# Patient Record
Sex: FEMALE | Race: BLACK OR AFRICAN AMERICAN | Employment: FULL TIME | URBAN - METROPOLITAN AREA
[De-identification: names, ages, dates, MRNs, and addresses within clinical notes are randomized per-mention and may not be internally consistent; named-entity substitution may affect disease eponyms.]

---

## 2024-07-07 ENCOUNTER — HOSPITAL ENCOUNTER (EMERGENCY)
Facility: HOSPITAL | Age: 34
Discharge: HOME | End: 2024-07-07
Attending: EMERGENCY MEDICINE
Payer: COMMERCIAL

## 2024-07-07 VITALS
BODY MASS INDEX: 23.98 KG/M2 | HEIGHT: 64 IN | SYSTOLIC BLOOD PRESSURE: 117 MMHG | DIASTOLIC BLOOD PRESSURE: 73 MMHG | WEIGHT: 140.43 LBS | HEART RATE: 77 BPM | OXYGEN SATURATION: 100 % | RESPIRATION RATE: 12 BRPM | TEMPERATURE: 98.2 F
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
## 2024-07-07 PROCEDURE — 99282 EMERGENCY DEPT VISIT SF MDM: CPT

## 2024-07-07 ASSESSMENT — PAIN - FUNCTIONAL ASSESSMENT: PAIN_FUNCTIONAL_ASSESSMENT: NONE - DENIES PAIN

## 2024-07-08 NOTE — ED TRIAGE NOTES
Pt reports to Ed with friend. Pt reports that she was bit by what she believes to be a black rat snake at around 2145. Pt's  cleaned wound with alcohol swab. Pt was bit on the lateral right ankle; pt denies pain but reports \"itchiness\" at site. Pt denies any swelling; bite is not actively bleeding. Pt denies any other symptoms.

## 2024-07-08 NOTE — ED NOTES
Pt given discharge instructions, pt education, 0 prescriptions and follow up information. Pt verbalizes understanding. All questions answered. Pt discharged to home in private vehicle with family, ambulatory. Pt A&O x4, RA, pain controlled.

## 2024-07-08 NOTE — ED PROVIDER NOTES
[facility] [place] EMERGENCY DEPT  EMERGENCY DEPARTMENT ENCOUNTER      Pt Name: Donna Tobias  MRN: [id number]  Birthdate 1990  Date of evaluation: 7/7/2024  Provider: Derek Colby MD    CHIEF COMPLAINT       Chief Complaint   Patient presents with    Snake Bite     Lateral right ankle         HISTORY OF PRESENT ILLNESS   (Location/Symptom, Timing/Onset, Context/Setting, Quality, Duration, Modifying Factors, Severity)  Note limiting factors.   34-year-old who presents accompanied by her  with complaints of a snakebite.  It occurred approximately 1 hour ago.  She was walking up some stairs and initially thought she stepped on a stick.  She felt a pinch to her right lateral ankle.  She noted a black snake bit her.  No significant redness, swelling, pain.  Tetanus is up-to-date.          Review of External Medical Records:     Nursing Notes were reviewed.    REVIEW OF SYSTEMS    (2-9 systems for level 4, 10 or more for level 5)     Review of Systems    Except as noted above the remainder of the review of systems was reviewed and negative.       PAST MEDICAL HISTORY     Past Medical History:   Diagnosis Date    Encounter for insertion of mirena IUD 05/16/2013    Encounter for IUD removal 01/20/2016    Post partum depression 2007    1st birth; Zoloft needed for 6 months         SURGICAL HISTORY       Past Surgical History:   Procedure Laterality Date    CERVIX BIOPSY  12/2017    cervical conization    OTHER SURGICAL HISTORY  2/2004    eye surgery; eyelid    TUBAL LIGATION      WISDOM TOOTH EXTRACTION  2011    general anesthesia         CURRENT MEDICATIONS       Previous Medications    ZOLPIDEM (AMBIEN) 5 MG TABLET    Take 5 mg by mouth.       ALLERGIES     Peanut oil    FAMILY HISTORY       Family History   Problem Relation Age of Onset    No Known Problems Son     No Known Problems Son     No Known Problems Son     No Known Problems Son     No Known Problems Daughter     No Known Problems Half-Sister     No Known

## 2025-02-06 ENCOUNTER — CLINICAL DOCUMENTATION (OUTPATIENT)
Age: 35
End: 2025-02-06

## 2025-02-06 NOTE — PROGRESS NOTES
Rec'd a referral from Jackie Arce NP for bilateral lump. Called and was told the office took so long to schedule her MRI she already scheduled it and would not need the appointment at this time.